# Patient Record
Sex: FEMALE | Race: WHITE | NOT HISPANIC OR LATINO | Employment: FULL TIME | URBAN - METROPOLITAN AREA
[De-identification: names, ages, dates, MRNs, and addresses within clinical notes are randomized per-mention and may not be internally consistent; named-entity substitution may affect disease eponyms.]

---

## 2021-07-15 ENCOUNTER — OFFICE VISIT (OUTPATIENT)
Dept: OBSTETRICS AND GYNECOLOGY | Facility: CLINIC | Age: 26
End: 2021-07-15

## 2021-07-15 VITALS
BODY MASS INDEX: 41.91 KG/M2 | DIASTOLIC BLOOD PRESSURE: 80 MMHG | HEIGHT: 67 IN | WEIGHT: 267 LBS | SYSTOLIC BLOOD PRESSURE: 144 MMHG

## 2021-07-15 DIAGNOSIS — Z71.85 HPV VACCINE COUNSELING: ICD-10-CM

## 2021-07-15 DIAGNOSIS — Z31.69 PRE-CONCEPTION COUNSELING: ICD-10-CM

## 2021-07-15 DIAGNOSIS — Z01.419 WOMEN'S ANNUAL ROUTINE GYNECOLOGICAL EXAMINATION: Primary | ICD-10-CM

## 2021-07-15 DIAGNOSIS — Z30.41 ENCOUNTER FOR SURVEILLANCE OF CONTRACEPTIVE PILLS: ICD-10-CM

## 2021-07-15 DIAGNOSIS — Z12.39 ENCOUNTER FOR BREAST CANCER SCREENING USING NON-MAMMOGRAM MODALITY: ICD-10-CM

## 2021-07-15 PROCEDURE — 99395 PREV VISIT EST AGE 18-39: CPT | Performed by: OBSTETRICS & GYNECOLOGY

## 2021-07-15 RX ORDER — NORETHINDRONE ACETATE AND ETHINYL ESTRADIOL, AND FERROUS FUMARATE 1MG-20(24)
KIT ORAL
COMMUNITY
Start: 2021-06-28 | End: 2021-07-15 | Stop reason: SDUPTHER

## 2021-07-15 RX ORDER — NORETHINDRONE ACETATE AND ETHINYL ESTRADIOL, AND FERROUS FUMARATE 1MG-20(24)
1 KIT ORAL DAILY
Qty: 28 TABLET | Refills: 11 | Status: SHIPPED | OUTPATIENT
Start: 2021-07-15 | End: 2022-06-27

## 2021-07-15 RX ORDER — CITALOPRAM 20 MG/1
TABLET ORAL
COMMUNITY
Start: 2021-06-28

## 2021-07-15 NOTE — PROGRESS NOTES
GYN Annual Exam     CC- Here for annual exam.   Subjective   HPI  Chyna Richmond is a 25 y.o. female established patient who presents for annual well woman exam. Her periods are absent with OCP's. SHe has occ spotting. She denies  dysmenorrhea.  Partner Status: Marital Status: .  New Partners since last visit: no  Condom usage with IC: always.      She has had her Covid vaccine  She is considering attempting to conceive within the next year    The patient does not have complaints today.    She exercises regularly: no.  She has concerns about domestic violence: no.    OB History        0    Para   0    Term   0       0    AB   0    Living   0       SAB   0    TAB   0    Ectopic   0    Molar   0    Multiple   0    Live Births   0                Menarche: 12  Current contraception: OCP (estrogen/progesterone)  History of abnormal Pap smear: no  Family history of uterine, colon or ovarian cancer: no  Family history of breast cancer: no  H/o STDs: no   Last pap:2020  Gardasil:refuses  Thromboembolic Disease: none    Health Maintenance   Topic Date Due   • ANNUAL PHYSICAL  Never done   • HPV VACCINES (1 - 2-dose series) Never done   • TDAP/TD VACCINES (1 - Tdap) Never done   • HEPATITIS C SCREENING  Never done   • Annual Gynecologic Pelvic and Breast Exam  07/15/2021   • INFLUENZA VACCINE  2021   • PAP SMEAR  2023   • COVID-19 Vaccine  Completed   • Pneumococcal Vaccine 0-64  Aged Out       The following portions of the patient's history were reviewed and updated as appropriate: allergies, current medications, past family history, past medical history, past social history, past surgical history and problem list.    Review of Systems  Review of Systems   Constitutional: Negative.    HENT: Negative.    Eyes: Negative.    Respiratory: Negative.    Cardiovascular: Negative.    Gastrointestinal: Negative.    Endocrine: Negative.    Genitourinary: Negative.    Musculoskeletal: Negative.   "  Skin: Negative.    Allergic/Immunologic: Negative.    Neurological: Negative.    Hematological: Negative.    Psychiatric/Behavioral: Negative.        I have reviewed and agree with the HPI, ROS, and historical information as entered above. Carmencita Corey MD      Past Medical History:   Diagnosis Date   • Asthma    • PCOS (polycystic ovarian syndrome)         Past Surgical History:   Procedure Laterality Date   • ADENOIDECTOMY     • TONSILLECTOMY     • WISDOM TOOTH EXTRACTION            Objective   /80   Ht 170.2 cm (67\")   Wt 121 kg (267 lb)   BMI 41.82 kg/m²   Physical Exam  Vitals and nursing note reviewed. Exam conducted with a chaperone present.   Constitutional:       Appearance: She is well-developed.   HENT:      Head: Normocephalic and atraumatic.   Neck:      Thyroid: No thyroid mass or thyromegaly.   Cardiovascular:      Rate and Rhythm: Normal rate and regular rhythm.      Heart sounds: No murmur heard.     Pulmonary:      Effort: Pulmonary effort is normal. No retractions.      Breath sounds: Normal breath sounds. No wheezing, rhonchi or rales.   Chest:      Chest wall: No mass or tenderness.      Breasts:         Right: Normal. No mass, nipple discharge, skin change or tenderness.         Left: Normal. No mass, nipple discharge, skin change or tenderness.   Abdominal:      General: Bowel sounds are normal.      Palpations: Abdomen is soft. Abdomen is not rigid. There is no mass.      Tenderness: There is no abdominal tenderness. There is no guarding.      Hernia: No hernia is present. There is no hernia in the left inguinal area.   Genitourinary:     Labia:         Right: No rash, tenderness or lesion.         Left: No rash, tenderness or lesion.       Vagina: Normal. No vaginal discharge or lesions.      Cervix: No cervical motion tenderness, discharge, lesion or cervical bleeding.      Uterus: Normal. Not enlarged, not fixed and not tender.       Adnexa:         Right: No mass or " tenderness.          Left: No mass or tenderness.        Rectum: No external hemorrhoid.   Musculoskeletal:      Cervical back: Normal range of motion. No muscular tenderness.   Neurological:      Mental Status: She is alert and oriented to person, place, and time.   Psychiatric:         Behavior: Behavior normal.            Assessment    Encounter Diagnoses   Name Primary?   • Women's annual routine gynecological examination Yes   • HPV vaccine counseling    • Encounter for breast cancer screening using non-mammogram modality    • Encounter for surveillance of contraceptive pills    • Pre-conception counseling        Plan     1. Happy on current CATHI - understands inherent risks including increase lifetime risk of breast cancer dx, VTE and stroke.  Understands CATHI reduce lifetime risk of ovarian cancer. Understands other options of BC.  2. Recommended use of Vitamin D replacement and getting adequate calcium in her diet. (1500mg)  3. Reviewed monthly self breast exams.  Instructed to call with lumps, pain, or breast discharge.    4.  Preconceptual counseling-  encouraged being on folic acid 1-4 mg.  Reviewed current medications.  Discussed optional preconceptual carrier testing including CF, SMA and Fragile X. Syndrome.   Understands to be up to date on vaccinations.  Information on Good Health Before Pregnancy given.  5. Reviewed HPV guidelines  6. Elevated BP - she has known white coat syndrome  7. Reccommended Flu Vaccine in Fall of each year.  8. RTC in 1 year or PRN with problems           Carmencita Corey MD  07/15/2021

## 2022-06-27 ENCOUNTER — DOCUMENTATION (OUTPATIENT)
Dept: OBSTETRICS AND GYNECOLOGY | Facility: CLINIC | Age: 27
End: 2022-06-27

## 2022-06-27 RX ORDER — NORETHINDRONE ACETATE AND ETHINYL ESTRADIOL, AND FERROUS FUMARATE 1MG-20(24)
KIT ORAL
Qty: 30 TABLET | Refills: 0 | Status: SHIPPED | OUTPATIENT
Start: 2022-06-27 | End: 2022-07-22

## 2022-07-22 RX ORDER — NORETHINDRONE ACETATE AND ETHINYL ESTRADIOL, AND FERROUS FUMARATE 1MG-20(24)
KIT ORAL
Qty: 28 TABLET | Refills: 1 | Status: SHIPPED | OUTPATIENT
Start: 2022-07-22 | End: 2022-08-30 | Stop reason: SDUPTHER

## 2022-08-30 ENCOUNTER — OFFICE VISIT (OUTPATIENT)
Dept: OBSTETRICS AND GYNECOLOGY | Facility: CLINIC | Age: 27
End: 2022-08-30

## 2022-08-30 VITALS — DIASTOLIC BLOOD PRESSURE: 78 MMHG | WEIGHT: 259 LBS | SYSTOLIC BLOOD PRESSURE: 118 MMHG | BODY MASS INDEX: 40.57 KG/M2

## 2022-08-30 DIAGNOSIS — Z30.41 ENCOUNTER FOR SURVEILLANCE OF CONTRACEPTIVE PILLS: ICD-10-CM

## 2022-08-30 DIAGNOSIS — Z01.419 WOMEN'S ANNUAL ROUTINE GYNECOLOGICAL EXAMINATION: Primary | ICD-10-CM

## 2022-08-30 PROCEDURE — 99395 PREV VISIT EST AGE 18-39: CPT | Performed by: NURSE PRACTITIONER

## 2022-08-30 RX ORDER — NORETHINDRONE ACETATE AND ETHINYL ESTRADIOL, AND FERROUS FUMARATE 1MG-20(24)
1 KIT ORAL DAILY
Qty: 84 TABLET | Refills: 3 | Status: SHIPPED | OUTPATIENT
Start: 2022-08-30

## 2022-08-30 RX ORDER — FEXOFENADINE HYDROCHLORIDE 60 MG/1
TABLET, FILM COATED ORAL
COMMUNITY

## 2022-08-30 NOTE — PROGRESS NOTES
Gynecologic Annual Exam Note        annual        Subjective     HPI  Chyna Richmond is a 26 y.o.  female who presents for annual well woman exam as a established patient. There were no changes to her medical or surgical history since her last visit.. Patient reports problems with: none. Patient's last menstrual period was 2022 (exact date).. Her periods are irregular, secondary to OCP. Partner Status: Marital Status: .  She is sexually active. She has not had new partners.. STD testing recommendations have been explained to the patient and she does not desire STD testing.    Additional OB/GYN History   Current contraception: contraceptive methods: OCP (estrogen/progesterone)  Desires to: continue contraception  Thromboembolic Disease: none    History of STD: no    Last Pap : 7/15/2021. Results: negative. HPV: negative  Last Completed Pap Smear          PAP SMEAR (Every 3 Years) Next due on 7/15/2024    07/15/2021  SCANNED - PAP SMEAR    2020  Done - neg                 History of abnormal Pap smear: no  Gardasil status:refuses  Family history of uterine, colon, breast, or ovarian cancer: no  Performs monthly Self-Breast Exam: yes  Exercises Regularly:no  Feelings of Anxiety or Depression: yes - managed with medication  Tobacco Usage?: No       Current Outpatient Medications:   •  budesonide (RINOCORT AQUA) 32 MCG/ACT nasal spray, , Disp: , Rfl:   •  citalopram (CeleXA) 20 MG tablet, , Disp: , Rfl:   •  fexofenadine (ALLEGRA) 60 MG tablet, , Disp: , Rfl:   •  Grecia 24 Fe 1-20 MG-MCG(24) per tablet, TAKE ONE TABLET BY MOUTH DAILY, Disp: 28 tablet, Rfl: 1     Patient is requesting refills of OCP.    OB History        0    Para   0    Term   0       0    AB   0    Living   0       SAB   0    IAB   0    Ectopic   0    Molar   0    Multiple   0    Live Births   0                Health Maintenance   Topic Date Due   • ANNUAL PHYSICAL  Never done   • TDAP/TD VACCINES (1 - Tdap)  Never done   • HEPATITIS C SCREENING  Never done   • Annual Gynecologic Pelvic and Breast Exam  07/16/2022   • INFLUENZA VACCINE  10/01/2022   • PAP SMEAR  07/15/2024   • COVID-19 Vaccine  Completed   • Pneumococcal Vaccine 0-64  Aged Out       Past Medical History:   Diagnosis Date   • Asthma    • PCOS (polycystic ovarian syndrome)         Past Surgical History:   Procedure Laterality Date   • ADENOIDECTOMY     • TONSILLECTOMY     • WISDOM TOOTH EXTRACTION         The additional following portions of the patient's history were reviewed and updated as appropriate: allergies, current medications, past family history, past medical history, past social history, past surgical history and problem list.    Review of Systems   Constitutional: Negative.    HENT: Negative.    Eyes: Negative.    Respiratory: Negative.    Cardiovascular: Negative.    Gastrointestinal: Negative.    Endocrine: Negative.    Genitourinary: Negative.    Musculoskeletal: Negative.    Skin: Negative.    Allergic/Immunologic: Negative.    Neurological: Negative.    Hematological: Negative.    Psychiatric/Behavioral: Negative.          I have reviewed and agree with the HPI, ROS, and historical information as entered above. Oneida Castañeda, APRN        Objective   /78   Wt 117 kg (259 lb)   LMP 08/21/2022 (Exact Date)   Breastfeeding No   BMI 40.57 kg/m²     Physical Exam  Vitals and nursing note reviewed. Exam conducted with a chaperone present.   Constitutional:       General: She is not in acute distress.     Appearance: Normal appearance. She is well-developed. She is obese. She is not ill-appearing.   HENT:      Head: Normocephalic and atraumatic.   Neck:      Thyroid: No thyroid mass or thyromegaly.   Pulmonary:      Effort: Pulmonary effort is normal. No retractions.   Chest:      Chest wall: No mass.   Breasts:      Right: Normal. No mass, nipple discharge, skin change or tenderness.      Left: Normal. No mass, nipple discharge, skin  change or tenderness.       Abdominal:      Palpations: Abdomen is soft. Abdomen is not rigid. There is no mass.      Tenderness: There is no abdominal tenderness. There is no guarding.      Hernia: No hernia is present. There is no hernia in the left inguinal area.   Genitourinary:     General: Normal vulva.      Labia:         Right: No rash, tenderness or lesion.         Left: No rash, tenderness or lesion.       Vagina: Normal. No vaginal discharge or lesions.      Cervix: Normal.      Uterus: Normal. Not enlarged, not fixed and not tender.       Adnexa: Right adnexa normal and left adnexa normal.        Right: No mass or tenderness.          Left: No mass or tenderness.        Rectum: No external hemorrhoid.   Musculoskeletal:      Cervical back: Normal range of motion. No muscular tenderness.   Skin:     General: Skin is warm and dry.   Neurological:      Mental Status: She is alert and oriented to person, place, and time.   Psychiatric:         Mood and Affect: Mood normal.         Behavior: Behavior normal.            Assessment and Plan    Problem List Items Addressed This Visit    None     Visit Diagnoses     Women's annual routine gynecological examination    -  Primary    Encounter for surveillance of contraceptive pills              1. GYN annual well woman exam.   2. Reviewed pap guidelines.   3. Encouraged use of condoms for STD prevention.  4. OCP's/Vaginal Ring - Discussed side effects of nausea, BTB, headaches, breast tenderness and slight weight gain in the first three cycles.  Understands risks of blood clots, stroke, and theoretical risk of breast cancer.  Denies family history of blood clots.  5. Reviewed monthly self breast exams.  Instructed to call with lumps, pain, or breast discharge.    6. Reviewed exercise as a preventative health measures.   7. Reccommended Flu Vaccine in Fall of each year.  8. RTC in 1 year or PRN with problems  Return in about 1 year (around 8/30/2023) for Annual  physical.      Oneida Castañeda, APRN  08/30/2022

## 2024-10-17 ENCOUNTER — OFFICE VISIT (OUTPATIENT)
Dept: OBSTETRICS AND GYNECOLOGY | Facility: CLINIC | Age: 29
End: 2024-10-17
Payer: COMMERCIAL

## 2024-10-17 VITALS
BODY MASS INDEX: 42.94 KG/M2 | SYSTOLIC BLOOD PRESSURE: 122 MMHG | HEIGHT: 67 IN | WEIGHT: 273.6 LBS | DIASTOLIC BLOOD PRESSURE: 84 MMHG

## 2024-10-17 DIAGNOSIS — E28.2 PCOS (POLYCYSTIC OVARIAN SYNDROME): ICD-10-CM

## 2024-10-17 DIAGNOSIS — N89.8 VAGINAL DISCHARGE: ICD-10-CM

## 2024-10-17 DIAGNOSIS — Z31.9 INFERTILITY MANAGEMENT: ICD-10-CM

## 2024-10-17 DIAGNOSIS — N93.9 ABNORMAL UTERINE BLEEDING (AUB): ICD-10-CM

## 2024-10-17 DIAGNOSIS — Z01.419 WOMEN'S ANNUAL ROUTINE GYNECOLOGICAL EXAMINATION: Primary | ICD-10-CM

## 2024-10-17 RX ORDER — SERTRALINE HCL 50 MG
TABLET ORAL
COMMUNITY

## 2024-10-17 RX ORDER — FLUTICASONE PROPIONATE 50 UG/1
SPRAY, METERED NASAL
COMMUNITY

## 2024-10-17 RX ORDER — LEVOCETIRIZINE DIHYDROCHLORIDE 5 MG/1
TABLET, FILM COATED ORAL
COMMUNITY

## 2024-10-17 NOTE — PROGRESS NOTES
Gynecologic Annual Exam Note        Gynecologic Exam (Annual )        Subjective     HPI  Chyna Richmond is a 29 y.o.  female who presents for annual well woman exam as a established patient. There were no changes to her medical or surgical history since her last visit.. Patient's last menstrual period was 10/09/2024.  Her periods are irregular and sporadic due to PCOS  The bleeding last for 4-7 and is heavy throughout period. She reports dysmenorrhea is severe occurring premenstrually and first 1-2 days of flow. Marital Status: .  She is sexually active. She has not had new partners.. STD testing recommendations have been explained to the patient and she does not desire STD testing.    The patient would like to discuss the following complaints today: infertility. She states that her and spouse have been actively trying to conceive for a year and a half. She has not tried any ovulation test due to irregular periods. She states that periods have been irregular since first starting menses at age 12 except for when she was on birth control. She states that she d/c birth control 2023.     Additional OB/GYN History   contraceptive methods: None  Desires to: do not start contraception  Thromboembolic Disease: family history  History of migraines: no  Age of menarche: 12    History of STD: no    Last Pap : 7/15/2021. Results: negative. HPV: negative.   Last Completed Pap Smear       This patient has no relevant Health Maintenance data.             History of abnormal Pap smear: no  Gardasil status:declines  Family history of uterine, colon, breast, or ovarian cancer: no  Performs monthly Self-Breast Exam: yes  Exercises Regularly:no  Feelings of Anxiety or Depression: yes - anxiety  Tobacco Usage?: No       Current Outpatient Medications:     fluticasone (Flonase Allergy Relief) 50 MCG/ACT nasal spray, , Disp: , Rfl:     levocetirizine (Xyzal Allergy 24HR) 5 MG tablet, , Disp: , Rfl:     Prenatal Vit-Fe  Fumarate-FA (PRENATAL VITAMIN PO), Take 1 tablet by mouth Daily., Disp: , Rfl:     Zoloft 50 MG tablet, , Disp: , Rfl:      Patient denies the need for medication refills today.    OB History          0    Para   0    Term   0       0    AB   0    Living   0         SAB   0    IAB   0    Ectopic   0    Molar   0    Multiple   0    Live Births   0                Health Maintenance   Topic Date Due    MOST FORM  Never done    BMI FOLLOWUP  Never done    TDAP/TD VACCINES (1 - Tdap) Never done    HEPATITIS C SCREENING  Never done    ANNUAL PHYSICAL  Never done    Annual Gynecologic Pelvic and Breast Exam  2023    PAP SMEAR  07/15/2024    COVID-19 Vaccine  Completed    INFLUENZA VACCINE  Completed    Pneumococcal Vaccine 0-64  Aged Out       Past Medical History:   Diagnosis Date    Anxiety     Asthma     PCOS (polycystic ovarian syndrome)     Polycystic ovary syndrome     Urinary tract infection     As a child    Varicella     As a child        Past Surgical History:   Procedure Laterality Date    ADENOIDECTOMY      TONSILLECTOMY      WISDOM TOOTH EXTRACTION         The additional following portions of the patient's history were reviewed and updated as appropriate: allergies, current medications, past family history, past medical history, past social history, past surgical history, and problem list.    Review of Systems   Constitutional: Negative.    HENT: Negative.     Eyes: Negative.    Respiratory: Negative.     Cardiovascular: Negative.    Gastrointestinal: Negative.    Endocrine: Negative.    Genitourinary:  Positive for menstrual problem and vaginal discharge.        Infertility   Musculoskeletal: Negative.    Skin: Negative.    Allergic/Immunologic: Negative.    Neurological: Negative.    Hematological: Negative.    Psychiatric/Behavioral: Negative.          Anxiety         I have reviewed and agree with the HPI, ROS, and historical information as entered above. Ana Tobar, APRN         "  Objective   /84   Ht 170.2 cm (67\")   Wt 124 kg (273 lb 9.6 oz)   LMP 10/09/2024   BMI 42.85 kg/m²     Physical Exam  Vitals and nursing note reviewed. Exam conducted with a chaperone present.   Constitutional:       General: She is not in acute distress.     Appearance: Normal appearance. She is well-developed. She is obese. She is not ill-appearing, toxic-appearing or diaphoretic.   HENT:      Head: Normocephalic and atraumatic.   Cardiovascular:      Rate and Rhythm: Normal rate.      Heart sounds: No murmur heard.  Pulmonary:      Effort: Pulmonary effort is normal. No respiratory distress or retractions.   Chest:      Chest wall: No mass.   Breasts:     Right: Normal. No mass, nipple discharge, skin change or tenderness.      Left: Normal. No mass, nipple discharge, skin change or tenderness.   Abdominal:      General: There is no distension.      Palpations: Abdomen is soft. Abdomen is not rigid. There is no mass.      Tenderness: There is no abdominal tenderness. There is no guarding or rebound.      Hernia: No hernia is present.   Genitourinary:     General: Normal vulva.      Exam position: Lithotomy position.      Labia:         Right: No rash, tenderness or lesion.         Left: No rash, tenderness or lesion.       Vagina: Normal. No vaginal discharge or lesions.      Cervix: Normal. Discharge present. No cervical motion tenderness, lesion or cervical bleeding.      Uterus: Normal. Not enlarged, not fixed and not tender.       Adnexa: Right adnexa normal and left adnexa normal.        Right: No mass or tenderness.          Left: No mass or tenderness.        Rectum: Normal. No external hemorrhoid.      Comments: Clear thin discharge  Musculoskeletal:      Cervical back: Normal range of motion.   Skin:     General: Skin is warm and dry.   Neurological:      Mental Status: She is alert and oriented to person, place, and time.   Psychiatric:         Attention and Perception: Attention normal.    "      Mood and Affect: Mood normal.         Speech: Speech normal.         Behavior: Behavior normal. Behavior is cooperative.         Thought Content: Thought content normal.         Cognition and Memory: Cognition normal.         Judgment: Judgment normal.            Assessment and Plan    Problem List Items Addressed This Visit          Endocrine and Metabolic    PCOS (polycystic ovarian syndrome)    Relevant Orders    CBC & Differential    Comprehensive Metabolic Panel    DHEA-Sulfate    Prolactin    T4, Free    Testosterone    TSH    Hemoglobin A1c    US Non-ob Transvaginal     Other Visit Diagnoses       Women's annual routine gynecological examination    -  Primary    Relevant Orders    LIQUID-BASED PAP SMEAR WITH HPV GENOTYPING REGARDLESS OF INTERPRETATION ([facility])    Infertility management        Relevant Orders    CBC & Differential    Comprehensive Metabolic Panel    DHEA-Sulfate    Prolactin    T4, Free    Testosterone    TSH    Hemoglobin A1c    US Non-ob Transvaginal    Vaginal discharge        Relevant Orders    Bacterial Vaginosis, AILEEN - Swab, Cervix    Candida panel, PCR - Swab, Vagina    Abnormal uterine bleeding (AUB)        Relevant Orders    US Non-ob Transvaginal            GYN annual well woman exam.   Reviewed pap guidelines. Pap today.  Nu swab for clear vaginal discharge.  Fibrocystic breast changes - Encouraged decreasing caffeine, supportive bra, low dose vitamin E supplementation.  Reviewed monthly self breast exams.  Instructed to call with lumps, pain, or breast discharge.    Reviewed BMI and weight loss as preventative health measures.   Reviewed exercise as a preventative health measures.   Preconceptual Counseling - Discussed cystic fibrosis screening, rubella screening, chicken pox immunity, folic acid supplementation and HIV screening.   We discussed trying to conceive. Taking prenatal with folic acid every day. Continue healthy diet and exercise. Stop alcohol and smoking if  currently using. May use ovulation kits and cycle tracking. Call if any cramping or bleeding occur after positive pregnancy test. Patient verbalized understand and plan of care.    Infertility/abnormal menses/hx pcos labs today  Follow up with vaginal ultrasound.   Patient may be interested in trying clomid at next visit.     Ana Tobar, APRN  10/17/2024

## 2024-10-18 LAB
A VAGINAE DNA VAG QL NAA+PROBE: NORMAL SCORE
ALBUMIN SERPL-MCNC: 4.6 G/DL (ref 4–5)
ALP SERPL-CCNC: 111 IU/L (ref 44–121)
ALT SERPL-CCNC: 27 IU/L (ref 0–32)
AST SERPL-CCNC: 27 IU/L (ref 0–40)
BASOPHILS # BLD AUTO: 0 X10E3/UL (ref 0–0.2)
BASOPHILS NFR BLD AUTO: 0 %
BILIRUB SERPL-MCNC: 0.3 MG/DL (ref 0–1.2)
BUN SERPL-MCNC: 9 MG/DL (ref 6–20)
BUN/CREAT SERPL: 14 (ref 9–23)
BVAB2 DNA VAG QL NAA+PROBE: NORMAL SCORE
CALCIUM SERPL-MCNC: 9.4 MG/DL (ref 8.7–10.2)
CHLORIDE SERPL-SCNC: 102 MMOL/L (ref 96–106)
CO2 SERPL-SCNC: 23 MMOL/L (ref 20–29)
CREAT SERPL-MCNC: 0.65 MG/DL (ref 0.57–1)
DHEA-S SERPL-MCNC: 264 UG/DL (ref 84.8–378)
EGFRCR SERPLBLD CKD-EPI 2021: 121 ML/MIN/1.73
EOSINOPHIL # BLD AUTO: 0.1 X10E3/UL (ref 0–0.4)
EOSINOPHIL NFR BLD AUTO: 1 %
ERYTHROCYTE [DISTWIDTH] IN BLOOD BY AUTOMATED COUNT: 14 % (ref 11.7–15.4)
GLOBULIN SER CALC-MCNC: 2.5 G/DL (ref 1.5–4.5)
GLUCOSE SERPL-MCNC: 76 MG/DL (ref 70–99)
HBA1C MFR BLD: 5.9 % (ref 4.8–5.6)
HCT VFR BLD AUTO: 41.5 % (ref 34–46.6)
HGB BLD-MCNC: 12.8 G/DL (ref 11.1–15.9)
IMM GRANULOCYTES # BLD AUTO: 0 X10E3/UL (ref 0–0.1)
IMM GRANULOCYTES NFR BLD AUTO: 0 %
LYMPHOCYTES # BLD AUTO: 3.6 X10E3/UL (ref 0.7–3.1)
LYMPHOCYTES NFR BLD AUTO: 30 %
MCH RBC QN AUTO: 24.4 PG (ref 26.6–33)
MCHC RBC AUTO-ENTMCNC: 30.8 G/DL (ref 31.5–35.7)
MCV RBC AUTO: 79 FL (ref 79–97)
MEGA1 DNA VAG QL NAA+PROBE: NORMAL SCORE
MONOCYTES # BLD AUTO: 0.7 X10E3/UL (ref 0.1–0.9)
MONOCYTES NFR BLD AUTO: 6 %
NEUTROPHILS # BLD AUTO: 7.5 X10E3/UL (ref 1.4–7)
NEUTROPHILS NFR BLD AUTO: 63 %
PLATELET # BLD AUTO: 427 X10E3/UL (ref 150–450)
POTASSIUM SERPL-SCNC: 4.3 MMOL/L (ref 3.5–5.2)
PROLACTIN SERPL-MCNC: 16.3 NG/ML (ref 4.8–33.4)
PROT SERPL-MCNC: 7.1 G/DL (ref 6–8.5)
RBC # BLD AUTO: 5.25 X10E6/UL (ref 3.77–5.28)
SODIUM SERPL-SCNC: 140 MMOL/L (ref 134–144)
T4 FREE SERPL-MCNC: 1.29 NG/DL (ref 0.82–1.77)
TESTOST SERPL-MCNC: 32 NG/DL (ref 13–71)
TSH SERPL DL<=0.005 MIU/L-ACNC: 0.53 UIU/ML (ref 0.45–4.5)
WBC # BLD AUTO: 12 X10E3/UL (ref 3.4–10.8)

## 2024-10-21 LAB
C ALBICANS DNA VAG QL NAA+PROBE: NEGATIVE
C GLABRATA DNA VAG QL NAA+PROBE: NEGATIVE
C KRUSEI DNA VAG QL NAA+PROBE: NEGATIVE
C LUSITANIAE DNA VAG QL NAA+PROBE: NEGATIVE
CANDIDA DNA VAG QL NAA+PROBE: NEGATIVE
REF LAB TEST METHOD: NORMAL

## 2024-11-11 ENCOUNTER — TELEPHONE (OUTPATIENT)
Dept: OBSTETRICS AND GYNECOLOGY | Facility: CLINIC | Age: 29
End: 2024-11-11
Payer: COMMERCIAL

## 2024-11-11 NOTE — TELEPHONE ENCOUNTER
Caller: Chyna Richmond    Relationship: Self    Best call back number: [phone] (home)       What is the best time to reach you: AFTER 2:30PM    Who are you requesting to speak with (clinical staff, provider,  specific staff member):       What was the call regarding: RESCHEDULING APPT     Is it okay if the provider responds through MyChart: YES      PT RETURNING CALL TO R/S HER US AND GYN FOLLOW UP.

## 2024-11-25 ENCOUNTER — OFFICE VISIT (OUTPATIENT)
Dept: OBSTETRICS AND GYNECOLOGY | Facility: CLINIC | Age: 29
End: 2024-11-25
Payer: COMMERCIAL

## 2024-11-25 VITALS — WEIGHT: 272 LBS | DIASTOLIC BLOOD PRESSURE: 76 MMHG | BODY MASS INDEX: 42.6 KG/M2 | SYSTOLIC BLOOD PRESSURE: 114 MMHG

## 2024-11-25 DIAGNOSIS — Z31.83 ENCOUNTER FOR ASSISTED REPRODUCTIVE FERTILITY CYCLE: ICD-10-CM

## 2024-11-25 DIAGNOSIS — N94.9 ADNEXAL CYST: ICD-10-CM

## 2024-11-25 DIAGNOSIS — E28.2 PCOS (POLYCYSTIC OVARIAN SYNDROME): Primary | ICD-10-CM

## 2024-11-25 RX ORDER — PNV NO.95/FERROUS FUM/FOLIC AC 28MG-0.8MG
1 TABLET ORAL DAILY
Qty: 90 TABLET | Refills: 3 | Status: SHIPPED | OUTPATIENT
Start: 2024-11-25

## 2024-11-25 RX ORDER — CLOMIPHENE CITRATE 50 MG/1
TABLET ORAL
Qty: 5 TABLET | Refills: 0 | Status: SHIPPED | OUTPATIENT
Start: 2024-11-25 | End: 2024-11-30

## 2024-11-25 RX ORDER — METFORMIN HYDROCHLORIDE 500 MG/1
500 TABLET, EXTENDED RELEASE ORAL
Qty: 90 TABLET | Refills: 3 | Status: SHIPPED | OUTPATIENT
Start: 2024-11-25

## 2024-11-25 NOTE — PROGRESS NOTES
Chief Complaint   Patient presents with    Follow-up       Subjective   HPI  Chyna Richmond is a 29 y.o. female, . Her last LMP was Patient's last menstrual period was 11/15/2024 (exact date).. who presents for follow up on family planning.   Patient was last seen 10/17/24 where she reported that her and spouse have been actively trying to conceive for a year and a half. She had not tried any ovulation test due to irregular periods. She stated that periods have been irregular since first starting menses at age 12 except for when she was on birth control. She stated that she d/c birth control 2023.     Plan was to start prenatal with folic acid, ovulation kits, and cycle tracking and to follow up with US. She had her period when she was expecting to, she did check ovulation for month of October, she started her period 10/9/24 and on 10/26/24 7.6 on her ovulation chart.     US done today and showed right and left ovary enlarged in appearance, multifollicular vs polycystic.       Additional OB/GYN History     Last Pap :   Last Completed Pap Smear            PAP SMEAR (Every 3 Years) Next due on 10/17/2027      10/17/2024  LIQUID-BASED PAP SMEAR WITH HPV GENOTYPING REGARDLESS OF INTERPRETATION ([facility])    07/15/2021  SCANNED - PAP SMEAR    2020  Done - neg                    Last mammogram:   Last Completed Mammogram       This patient has no relevant Health Maintenance data.            OB History          0    Para   0    Term   0       0    AB   0    Living   0         SAB   0    IAB   0    Ectopic   0    Molar   0    Multiple   0    Live Births   0                  Current Outpatient Medications:     clomiPHENE (CLOMID) 50 MG tablet, 1 tablet daily day 3 - 7, Disp: 5 tablet, Rfl: 0    fluticasone (Flonase Allergy Relief) 50 MCG/ACT nasal spray, , Disp: , Rfl:     levocetirizine (Xyzal Allergy 24HR) 5 MG tablet, , Disp: , Rfl:     metFORMIN ER (GLUCOPHAGE-XR) 500 MG 24 hr  tablet, Take 1 tablet by mouth Daily With Breakfast., Disp: 90 tablet, Rfl: 3    Prenatal Vit-Fe Fumarate-FA (Prenatal Vitamin) 27-0.8 MG tablet, Take 1 tablet by mouth Daily., Disp: 90 tablet, Rfl: 3    Zoloft 50 MG tablet, , Disp: , Rfl:      Past Medical History:   Diagnosis Date    Anxiety     Asthma     PCOS (polycystic ovarian syndrome)     Polycystic ovary syndrome     Urinary tract infection     As a child    Varicella     As a child        Past Surgical History:   Procedure Laterality Date    ADENOIDECTOMY      TONSILLECTOMY      WISDOM TOOTH EXTRACTION         The additional following portions of the patient's history were reviewed and updated as appropriate: allergies and current medications.    Review of Systems   Genitourinary:  Positive for menstrual problem.   All other systems reviewed and are negative.      I have reviewed and agree with the HPI, ROS, and historical information as entered above. Ana Tobar, APRN      Objective   /76   Wt 123 kg (272 lb)   LMP 11/15/2024 (Exact Date)   BMI 42.60 kg/m²     Physical Exam  Vitals and nursing note reviewed.   Constitutional:       General: She is not in acute distress.     Appearance: Normal appearance. She is obese. She is not ill-appearing, toxic-appearing or diaphoretic.   Pulmonary:      Effort: Pulmonary effort is normal.   Skin:     General: Skin is warm and dry.   Neurological:      Mental Status: She is alert and oriented to person, place, and time.   Psychiatric:         Mood and Affect: Mood normal.         Behavior: Behavior normal.         Thought Content: Thought content normal.         Judgment: Judgment normal.         Assessment & Plan     Assessment     Problem List Items Addressed This Visit          Endocrine and Metabolic    PCOS (polycystic ovarian syndrome) - Primary    Relevant Medications    metFORMIN ER (GLUCOPHAGE-XR) 500 MG 24 hr tablet    Other Relevant Orders    US Non-ob Transvaginal     Other Visit Diagnoses        Encounter for assisted reproductive fertility cycle        Relevant Medications    Prenatal Vit-Fe Fumarate-FA (Prenatal Vitamin) 27-0.8 MG tablet    clomiPHENE (CLOMID) 50 MG tablet    Adnexal cyst        Relevant Orders    US Non-ob Transvaginal          Plan     GYN follow up for PCOS, fertility screening  Vag us showed multi follicular vs polycystic ovaries. Left adnexal simple cyst visualized left of midline in the posterior cul-de-sac 13.5mm x 10.3mm x 11.4mm no detectable blood flow-patient denies any pain. Ectopic precautions given.  Rx Clomid day 3-7 on period. [place] days 10, 12, 14, 16, 18. Optional progesterone blood work days 21-23. On day 40 take pregnancy test (with or without bleeding) and notify office. May need to increase dose of clomid if unsuccessful. Handout given to patient explained in detail. VU. Rx sent. Follow up 2-3 months or PRN.   We discussed trying to conceive. Taking prenatal with folic acid every day. Continue healthy diet and exercise. Stop alcohol and smoking if currently using. May use ovulation kits and cycle tracking. Call if any cramping or bleeding occur after positive pregnancy test. Patient verbalized understand and plan of care.    We discussed diagnosis of polycystic ovarian syndrome requires at least two criteria including anovulation, ultrasound showing polycystic ovaries, or Clinical and/or biochemical signs of hyperandrogenism (per [place] criteria) -metformin rx including side effects discussed.  Follow up in 3 months with repeat us (if not pregnant by then)        Ana Tobar, APRN  11/25/2024

## 2024-11-29 ENCOUNTER — PATIENT MESSAGE (OUTPATIENT)
Dept: OBSTETRICS AND GYNECOLOGY | Facility: CLINIC | Age: 29
End: 2024-11-29
Payer: COMMERCIAL

## 2024-12-02 ENCOUNTER — TELEPHONE (OUTPATIENT)
Dept: OBSTETRICS AND GYNECOLOGY | Facility: CLINIC | Age: 29
End: 2024-12-02
Payer: COMMERCIAL

## 2024-12-02 NOTE — TELEPHONE ENCOUNTER
Prior Auth pending for Clomid 50mg #5 (Key: [id number]) Drug  Clomid 50MG tablets [facility] Pending.     I called [facility] and they will call around a see who has the drug and transfer the rx so they can use the  discount. They are aware the PA is pending. There is no generic and the brand price he has was over $400. Goodrx is $26 at [employer] [facility] [id number] [facility] [employer] [id number] Member ID [id number]

## 2024-12-02 NOTE — TELEPHONE ENCOUNTER
Update on the PA for Clomid. [facility] [employer] received a request for coverage of Clomid 50MG OR TABS for you.  Your request was denied based on the terms of your prescription benefit plan. This is  the initial adverse coverage determination for this request. The reason for the denial  was:  *Drug Not Covered/Plan Exclusion - Your request for coverage was denied because  your prescription benefit plan does not cover the requested medication

## 2025-06-23 ENCOUNTER — TELEPHONE (OUTPATIENT)
Dept: OBSTETRICS AND GYNECOLOGY | Facility: CLINIC | Age: 30
End: 2025-06-23
Payer: COMMERCIAL

## 2025-06-23 DIAGNOSIS — N94.9 ADNEXAL CYST: ICD-10-CM

## 2025-06-23 DIAGNOSIS — E28.2 PCOS (POLYCYSTIC OVARIAN SYNDROME): Primary | ICD-10-CM

## 2025-06-23 NOTE — TELEPHONE ENCOUNTER
Pt called and states she is on day 40 of her cycle after taking clomid and her results are negative. Pt states she is needing a refill on her clomid for her next cycle.

## 2025-06-23 NOTE — TELEPHONE ENCOUNTER
PT CALLED BACK AND HAD A QUESTION ABOUT HER CLOMID, PT STATES SHE'S ON DAY 40 OF HER OLD CYCLE AND DAY 3 OF HER NEW CYCLE AND WOULD LIKE TO KNOW IF SHE NEEDS TO WAIT UNTIL NEXT CYCLE OR IF SHE CAN START IT TODAY

## 2025-06-23 NOTE — TELEPHONE ENCOUNTER
Established patient; last saw Dr. Corey in 2021; LOV 11/25/2024 with WILIAM Nash. Was given RX for one cycle of Clomid. Recommendation was to return in 2-3 months with ultrasound; canceled twice.  Returned patient's call.   She is on Cycle Day 3; asking for refill of Clomid. Had negative UPT 06/18/25 and again today.   Discussed recommendation for follow up ultrasound. Appointment scheduled; patient v/u and agreed.

## 2025-07-01 ENCOUNTER — OFFICE VISIT (OUTPATIENT)
Dept: OBSTETRICS AND GYNECOLOGY | Facility: CLINIC | Age: 30
End: 2025-07-01
Payer: COMMERCIAL

## 2025-07-01 VITALS
DIASTOLIC BLOOD PRESSURE: 76 MMHG | HEIGHT: 67 IN | WEIGHT: 266.4 LBS | SYSTOLIC BLOOD PRESSURE: 118 MMHG | BODY MASS INDEX: 41.81 KG/M2

## 2025-07-01 DIAGNOSIS — Z31.9 INFERTILITY MANAGEMENT: Primary | ICD-10-CM

## 2025-07-01 DIAGNOSIS — Z31.83 ENCOUNTER FOR ASSISTED REPRODUCTIVE FERTILITY CYCLE: ICD-10-CM

## 2025-07-01 LAB
B-HCG UR QL: NEGATIVE
EXPIRATION DATE: NORMAL
INTERNAL NEGATIVE CONTROL: NEGATIVE
INTERNAL POSITIVE CONTROL: POSITIVE
Lab: NORMAL

## 2025-07-01 RX ORDER — LETROZOLE 2.5 MG/1
2.5 TABLET, FILM COATED ORAL DAILY
Qty: 5 TABLET | Refills: 0 | Status: SHIPPED | OUTPATIENT
Start: 2025-07-01

## 2025-07-01 RX ORDER — TRIAMCINOLONE ACETONIDE 55 UG/1
1 SPRAY, METERED NASAL DAILY
COMMUNITY
Start: 2025-01-15

## 2025-07-01 NOTE — PROGRESS NOTES
Chief Complaint   Patient presents with    Follow-up    Ovarian Cyst    Contraception         Subjective   HPI  Chyna Richmond is a 29 y.o. female, , who presents for follow up evaluation of ovarian cyst and to discuss fertility with PCOS.      Her last LMP was Patient's last menstrual period was 2025 (exact date)..  She was last seen 7 month(s) ago. At that time the plan was expectant management for management of the cyst.  Rx one cycle of Clomid at that time. She took 1st round of Clomid on 25, took UPT on 25 and 25 that were both negative. Patient and spouse have been actively TTC for the past year, has not been using contraception since . She is requesting refill of Clomid.     Since her last visit she admits pelvic pain, severe left pelvic pain day prior to LMP that resolved later that same day.     Did the patient have u/s today? Yes    Additional OB/GYN History   Last Pap :   Last Completed Pap Smear            Upcoming       PAP SMEAR (Every 3 Years) Next due on 10/17/2027      10/17/2024  LIQUID-BASED PAP SMEAR WITH HPV GENOTYPING REGARDLESS OF INTERPRETATION ([place])    07/15/2021  SCANNED - PAP SMEAR    2020  Done - neg                          History of abnormal Pap smear: no  Tobacco Usage?: No      Current Outpatient Medications:     levocetirizine (Xyzal Allergy 24HR) 5 MG tablet, , Disp: , Rfl:     metFORMIN ER (GLUCOPHAGE-XR) 500 MG 24 hr tablet, Take 1 tablet by mouth Daily With Breakfast., Disp: 90 tablet, Rfl: 3    Nasacort Allergy 24HR 55 MCG/ACT nasal inhaler, Administer 1 spray into the nostril(s) as directed by provider Daily., Disp: , Rfl:     Prenatal Vit-Fe Fumarate-FA (Prenatal Vitamin) 27-0.8 MG tablet, Take 1 tablet by mouth Daily., Disp: 90 tablet, Rfl: 3    Zoloft 50 MG tablet, , Disp: , Rfl:     letrozole (Femara) 2.5 MG tablet, Take 1 tablet by mouth Daily. Days 3-7 of cycle, Disp: 5 tablet, Rfl: 0     Past Medical History:  "  Diagnosis Date    Anxiety     Asthma     Female infertility     PCOS    PCOS (polycystic ovarian syndrome)     Polycystic ovary syndrome     Urinary tract infection     As a child    Varicella     As a child        Past Surgical History:   Procedure Laterality Date    ADENOIDECTOMY      TONSILLECTOMY      WISDOM TOOTH EXTRACTION         The additional following portions of the patient's history were reviewed and updated as appropriate: allergies, current medications, past family history, past medical history, past social history, past surgical history, and problem list.    Review of Systems   Constitutional: Negative.    HENT: Negative.     Eyes: Negative.    Respiratory: Negative.     Cardiovascular: Negative.    Gastrointestinal: Negative.    Endocrine: Negative.    Genitourinary: Negative.    Musculoskeletal: Negative.    Skin: Negative.    Allergic/Immunologic: Negative.    Neurological: Negative.    Hematological: Negative.    Psychiatric/Behavioral: Negative.       All other systems reviewed and are negative.     I have reviewed and agree with the HPI, ROS, and historical information as entered above. Ana TAI Tobar, APRN      /76   Ht 170.2 cm (67\")   Wt 121 kg (266 lb 6.4 oz)   LMP 06/21/2025 (Exact Date)   BMI 41.72 kg/m²     Physical Exam  Vitals and nursing note reviewed.   Constitutional:       General: She is not in acute distress.     Appearance: Normal appearance. She is obese. She is not ill-appearing, toxic-appearing or diaphoretic.   Pulmonary:      Effort: Pulmonary effort is normal.   Neurological:      Mental Status: She is alert and oriented to person, place, and time.   Psychiatric:         Mood and Affect: Mood normal.         Behavior: Behavior normal.         Thought Content: Thought content normal.         Judgment: Judgment normal.         Assessment & Plan     Assessment and Plan    Problem List Items Addressed This Visit    None  Visit Diagnoses         Infertility " management    -  Primary    Relevant Orders    TSH    Antimullerian Hormone (AMH)    Hemoglobin A1c      Encounter for assisted reproductive fertility cycle        Relevant Medications    letrozole (Femara) 2.5 MG tablet    Other Relevant Orders    TSH    Antimullerian Hormone (AMH)    Hemoglobin A1c    POC Pregnancy, Urine (Completed)            GYN follow up with ultrasound   Ultrasound showed bilateral polycystic ovaries  Continue metformin and prenatal vitamins  Labs drawn today  Rx Femara day 3-7 on period. [place] days 10, 12, 14, 16, 18. Optional progesterone blood work days 21-23. On day 40 take pregnancy test (with or without bleeding) and notify office. May need to increase dose of clomid if unsuccessful. Handout given to patient explained in detail. VU. Rx sent. Follow up 2-3 months or PRN.   Sperm analysis hand out/order given to patient and her   Follow up 3 months with Dr. Madhav Tobar, APRN  07/01/2025

## 2025-07-02 LAB
HBA1C MFR BLD: 5.8 % (ref 4.8–5.6)
TSH SERPL DL<=0.005 MIU/L-ACNC: 0.69 UIU/ML (ref 0.45–4.5)

## 2025-07-08 LAB — MIS SERPL-MCNC: 8.36 NG/ML
